# Patient Record
Sex: FEMALE | Race: WHITE | NOT HISPANIC OR LATINO | URBAN - METROPOLITAN AREA
[De-identification: names, ages, dates, MRNs, and addresses within clinical notes are randomized per-mention and may not be internally consistent; named-entity substitution may affect disease eponyms.]

---

## 2020-06-28 ENCOUNTER — EMERGENCY (EMERGENCY)
Facility: HOSPITAL | Age: 50
LOS: 1 days | Discharge: HOME | End: 2020-06-28
Attending: EMERGENCY MEDICINE
Payer: MEDICAID

## 2020-06-28 VITALS
WEIGHT: 176.37 LBS | HEIGHT: 67 IN | DIASTOLIC BLOOD PRESSURE: 70 MMHG | SYSTOLIC BLOOD PRESSURE: 137 MMHG | HEART RATE: 96 BPM | OXYGEN SATURATION: 99 % | RESPIRATION RATE: 20 BRPM | TEMPERATURE: 99 F

## 2020-06-28 VITALS
OXYGEN SATURATION: 100 % | SYSTOLIC BLOOD PRESSURE: 111 MMHG | RESPIRATION RATE: 18 BRPM | DIASTOLIC BLOOD PRESSURE: 79 MMHG | TEMPERATURE: 98 F | HEART RATE: 64 BPM

## 2020-06-28 PROCEDURE — 99284 EMERGENCY DEPT VISIT MOD MDM: CPT

## 2020-06-28 PROCEDURE — 70450 CT HEAD/BRAIN W/O DYE: CPT | Mod: 26

## 2020-06-28 PROCEDURE — 71046 X-RAY EXAM CHEST 2 VIEWS: CPT

## 2020-06-28 PROCEDURE — 70450 CT HEAD/BRAIN W/O DYE: CPT

## 2020-06-28 PROCEDURE — 71046 X-RAY EXAM CHEST 2 VIEWS: CPT | Mod: 26

## 2020-06-28 RX ORDER — ONDANSETRON 8 MG/1
8 TABLET, FILM COATED ORAL ONCE
Refills: 0 | Status: COMPLETED | OUTPATIENT
Start: 2020-06-28 | End: 2020-06-28

## 2020-06-28 RX ORDER — ACETAMINOPHEN 500 MG
975 TABLET ORAL ONCE
Refills: 0 | Status: COMPLETED | OUTPATIENT
Start: 2020-06-28 | End: 2020-06-28

## 2020-06-28 RX ADMIN — ONDANSETRON 8 MILLIGRAM(S): 8 TABLET, FILM COATED ORAL at 11:04

## 2020-06-28 RX ADMIN — Medication 975 MILLIGRAM(S): at 10:30

## 2020-06-28 NOTE — ED ADULT NURSE NOTE - CAS TRG GENERAL [facility]WAY, MLM
Clinical Pharmacy Progress Note: Medication Education     Pharmacist educated patient and family on Eliquis indication, side effects, and drug interactions. Pharmacist discussed importance of medication compliance and reviewed signs of abnormal bleeding. Pharmacist gave patient and family Eliquis educational handout. Patient and family expressed understanding and had no further questions.    Thank you for allowing us to participate in this patient's care.     Vivian Richards 11/13/2019 12:25 PM     Patent

## 2020-06-28 NOTE — ED PROVIDER NOTE - PHYSICAL EXAMINATION
Well Appearing, Nontoxic, NAD;  Symm Facies, No external signs of trauma, PERRL 3mm, EOMI w/o pain/diplopia, MMM;  No CTL spine tender;  RRR w/o m/g/r, equal distal pulses;   CTAB w/o w/r/r;   Abd soft, nt/nd, +bs;  No jessi/joint deformity/tenderness;  AOX3, Normal speech, CN grossly intact, normal strength/sensation/gait, (-) ataxia

## 2020-06-28 NOTE — ED PROVIDER NOTE - NS ED ROS FT
(+) headache, off balance, nausea  (-) AMS/delirium, neck pain, numbness, weakness, sob/dyspnea, chest pain, open wounds, bruising

## 2020-06-28 NOTE — ED PROVIDER NOTE - CLINICAL SUMMARY MEDICAL DECISION MAKING FREE TEXT BOX
------------ATTENDING NOTE------------  pt c/o mechanical fall down several steps 48 hrs ago, hit R side of head on ground, no LOC but felt stunned, no additional injuries/complaints, having continued gradual onset tension type headaches and intermittent nausea and feeling off balance at times since injury, c/w concussion, CTL spine clinically cleared, benign stable chest/abdomen, LMP 3 wk ago,   - Oseas Gill MD   ------------------------------------------------------- ------------ATTENDING NOTE------------  pt c/o mechanical fall down several steps 48 hrs ago, hit R side of head on ground, no LOC but felt stunned, no additional injuries/complaints, having continued gradual onset tension type headaches and intermittent nausea and feeling off balance at times since injury, c/w concussion, CTL spine clinically cleared, benign stable chest/abdomen, LMP 3 wk ago, imaging wnl, benign repeat exams, nml gait, in depth dw pt about ddx, tx, ibanez, continued close outpt fu.  - Oseas Gill MD   -------------------------------------------------------

## 2020-06-28 NOTE — ED PROVIDER NOTE - NSFOLLOWUPINSTRUCTIONS_ED_ALL_ED_FT
See your Primary Doctor this week for follow up -- call to discuss.    Acetaminophen as directed for pain -- see medication warnings.    Continue all current medications / treatments as previously directed.    See CONCUSSION information and return instructions given to you.    Seek immediate medical care for new/worsening symptoms/concerns.

## 2020-06-28 NOTE — ED PROVIDER NOTE - PATIENT PORTAL LINK FT
You can access the FollowMyHealth Patient Portal offered by [facility] by registering at the following website: [email or web address]. By joining [employer]’s FollowMyHealth portal, you will also be able to view your health information using other applications (apps) compatible with our system.

## 2020-06-28 NOTE — ED ADULT NURSE NOTE - OBJECTIVE STATEMENT
49 female hx myasthenia gravis here c/o headache s/p fall 3 days ago. pt. states she had a mechanical fall at home, hitting the right side of her head. now c/o continued right anterior head pain, some nausea, but no vomiting. denies CP, SOB. no abd pain, denies LOC at time of fall. denies numbness/tingling. on exam complains of some pain to lower back but states "I always have this pain." no deformity, no step offs, no bleeding or abrasions to the head/scalp. PEERL, moving all extremities, strength and full ROM in tact. VSS, in no acute distress.

## 2023-02-25 ENCOUNTER — EMERGENCY (EMERGENCY)
Facility: HOSPITAL | Age: 53
LOS: 1 days | Discharge: HOME | End: 2023-02-25
Attending: EMERGENCY MEDICINE
Payer: MEDICAID

## 2023-02-25 VITALS
RESPIRATION RATE: 18 BRPM | TEMPERATURE: 97 F | SYSTOLIC BLOOD PRESSURE: 157 MMHG | DIASTOLIC BLOOD PRESSURE: 94 MMHG | HEART RATE: 78 BPM | OXYGEN SATURATION: 100 %

## 2023-02-25 VITALS
SYSTOLIC BLOOD PRESSURE: 118 MMHG | RESPIRATION RATE: 18 BRPM | DIASTOLIC BLOOD PRESSURE: 82 MMHG | HEIGHT: 55 IN | TEMPERATURE: 98 F | WEIGHT: 169.76 LBS | OXYGEN SATURATION: 99 % | HEART RATE: 73 BPM

## 2023-02-25 LAB
ALBUMIN SERPL ELPH-MCNC: 4.7 G/DL — SIGNIFICANT CHANGE UP (ref 3.3–5)
ALP SERPL-CCNC: 68 U/L — SIGNIFICANT CHANGE UP (ref 40–120)
ALT FLD-CCNC: 31 U/L — SIGNIFICANT CHANGE UP (ref 10–45)
ANION GAP SERPL CALC-SCNC: 11 MMOL/L — SIGNIFICANT CHANGE UP (ref 5–17)
AST SERPL-CCNC: 30 U/L — SIGNIFICANT CHANGE UP (ref 10–40)
BASOPHILS # BLD AUTO: 0.03 K/UL — SIGNIFICANT CHANGE UP (ref 0–0.2)
BASOPHILS NFR BLD AUTO: 0.3 % — SIGNIFICANT CHANGE UP (ref 0–2)
BILIRUB SERPL-MCNC: 0.4 MG/DL — SIGNIFICANT CHANGE UP (ref 0.2–1.2)
BUN SERPL-MCNC: 11 MG/DL — SIGNIFICANT CHANGE UP (ref 7–23)
CALCIUM SERPL-MCNC: 10 MG/DL — SIGNIFICANT CHANGE UP (ref 8.4–10.5)
CHLORIDE SERPL-SCNC: 102 MMOL/L — SIGNIFICANT CHANGE UP (ref 96–108)
CO2 SERPL-SCNC: 26 MMOL/L — SIGNIFICANT CHANGE UP (ref 22–31)
CREAT SERPL-MCNC: 0.54 MG/DL — SIGNIFICANT CHANGE UP (ref 0.5–1.3)
CRP SERPL-MCNC: 4 MG/L — SIGNIFICANT CHANGE UP (ref 0–4)
EGFR: 111 ML/MIN/1.73M2 — SIGNIFICANT CHANGE UP
EOSINOPHIL # BLD AUTO: 0.13 K/UL — SIGNIFICANT CHANGE UP (ref 0–0.5)
EOSINOPHIL NFR BLD AUTO: 1.2 % — SIGNIFICANT CHANGE UP (ref 0–6)
GLUCOSE SERPL-MCNC: 72 MG/DL — SIGNIFICANT CHANGE UP (ref 70–99)
HCT VFR BLD CALC: 40.1 % — SIGNIFICANT CHANGE UP (ref 34.5–45)
HGB BLD-MCNC: 12.7 G/DL — SIGNIFICANT CHANGE UP (ref 11.5–15.5)
IMM GRANULOCYTES NFR BLD AUTO: 0.4 % — SIGNIFICANT CHANGE UP (ref 0–0.9)
LYMPHOCYTES # BLD AUTO: 2.71 K/UL — SIGNIFICANT CHANGE UP (ref 1–3.3)
LYMPHOCYTES # BLD AUTO: 25.4 % — SIGNIFICANT CHANGE UP (ref 13–44)
MAGNESIUM SERPL-MCNC: 2.4 MG/DL — SIGNIFICANT CHANGE UP (ref 1.6–2.6)
MCHC RBC-ENTMCNC: 28.9 PG — SIGNIFICANT CHANGE UP (ref 27–34)
MCHC RBC-ENTMCNC: 31.7 GM/DL — LOW (ref 32–36)
MCV RBC AUTO: 91.3 FL — SIGNIFICANT CHANGE UP (ref 80–100)
MONOCYTES # BLD AUTO: 0.8 K/UL — SIGNIFICANT CHANGE UP (ref 0–0.9)
MONOCYTES NFR BLD AUTO: 7.5 % — SIGNIFICANT CHANGE UP (ref 2–14)
NEUTROPHILS # BLD AUTO: 6.94 K/UL — SIGNIFICANT CHANGE UP (ref 1.8–7.4)
NEUTROPHILS NFR BLD AUTO: 65.2 % — SIGNIFICANT CHANGE UP (ref 43–77)
NRBC # BLD: 0 /100 WBCS — SIGNIFICANT CHANGE UP (ref 0–0)
PLATELET # BLD AUTO: 292 K/UL — SIGNIFICANT CHANGE UP (ref 150–400)
POTASSIUM SERPL-MCNC: 4.7 MMOL/L — SIGNIFICANT CHANGE UP (ref 3.5–5.3)
POTASSIUM SERPL-SCNC: 4.7 MMOL/L — SIGNIFICANT CHANGE UP (ref 3.5–5.3)
PROT SERPL-MCNC: 7.4 G/DL — SIGNIFICANT CHANGE UP (ref 6–8.3)
RBC # BLD: 4.39 M/UL — SIGNIFICANT CHANGE UP (ref 3.8–5.2)
RBC # FLD: 13.4 % — SIGNIFICANT CHANGE UP (ref 10.3–14.5)
SODIUM SERPL-SCNC: 139 MMOL/L — SIGNIFICANT CHANGE UP (ref 135–145)
WBC # BLD: 10.65 K/UL — HIGH (ref 3.8–10.5)
WBC # FLD AUTO: 10.65 K/UL — HIGH (ref 3.8–10.5)

## 2023-02-25 PROCEDURE — 99283 EMERGENCY DEPT VISIT LOW MDM: CPT

## 2023-02-25 PROCEDURE — 83735 ASSAY OF MAGNESIUM: CPT

## 2023-02-25 PROCEDURE — 36415 COLL VENOUS BLD VENIPUNCTURE: CPT

## 2023-02-25 PROCEDURE — 86140 C-REACTIVE PROTEIN: CPT

## 2023-02-25 PROCEDURE — 99284 EMERGENCY DEPT VISIT MOD MDM: CPT

## 2023-02-25 PROCEDURE — 85652 RBC SED RATE AUTOMATED: CPT

## 2023-02-25 PROCEDURE — 85025 COMPLETE CBC W/AUTO DIFF WBC: CPT

## 2023-02-25 PROCEDURE — 80053 COMPREHEN METABOLIC PANEL: CPT

## 2023-02-25 NOTE — ED PROVIDER NOTE - ATTENDING CONTRIBUTION TO CARE
Bonifacio David MD:  I personally saw the patient and performed a substantive portion of the visit including all aspects of the medical decision making.    MDM: 52-year-old female with history of myasthenia gravis status post thymectomy, who presents with 5 to 7 days of left-sided facial pain associated with vision changes of the left eye.  Patient states that this feels different from her prior myasthenia gravis flares, which normally present with increased secretions and eyelid drooping.  However she denies any symptoms of that at this time.  Patient states that her vision changes are difficult to describe, and feels that it is heaviness of the left eye, but no facial eye drooping.  She also states that she has baseline double vision.  Patient also denies any chest pain, shortness of breath, dizziness, breathlessness.    ROS: patient denies trauma, fall, head injury, fevers, chills, dizziness, neck pain or stiffness, numbness, weakness, vomiting, slurred speech, imbalance.    On examination, patient with stable vitals, well-appearing, in no-acute distress.  Eye exam shows 20/30 vision in both eyes without her glasses.  EOMI without diplopia or discomfort reported.  No eyelid drooping.  Cardiac examination RRR, lungs CTAB, abdomen soft and nontender, neurovascularly intact in all 4 extremities.  NEURO exam shows AAOx3, Cranial nerves III-XII intact. Strength intact with 5/5 strength in all 4 extremities. Sensation intact to light touch in all 4 extremities. No pronator drift. No dysmetria with finger-nose-finger. Normal gait without ataxia. Normal balance and speech.    Will obtain CT Head and max face to evaluate for acute intracranial and maxillofacial pathology.  Will obtain labs to evaluate for hematologic disorder, metabolic derangements, hepatic and renal function, and screen for infectious pathology.  Will obtain ESR and CRP to evaluate for temporal arteritis.  Will consult with neurology and ophthalmology.  Patient without any focal neurodeficits and out of the window for stroke code or tPA or thrombectomy.    Differential includes but is not limited to: Temporal arteritis, trigeminal neuralgia, myasthenia gravis, intracranial pathology, ocular pathology.    [Patient with new problems requiring additional work-up and treatment, following orders: see above]  [Obtained and reviewed external records: N/A]  [Additional history obtained from:  at bedside]  [Chronic conditions and social determinants of health affecting care: See above]  [Consideration of admission/observation: The patient was AMA]    Labs reviewed, patient with WBC of 10.65, but CRP normal range, ESR still pending.  CT imaging not performed yet, and patient has not been seen by neurology or ophthalmology.  Patient was requesting to leave AMA.    Prolonged discussion with patient, patient should follow-up closely with neurology and ophthalmology within the next 2 days, or to return to the ED if she changes her mind or is unable to follow-up as outpatient.    Patient has decided to sign out against medical advice.  I had a detailed discussion involving the risks, consequences and alternatives to signing out AMA. The patient and/or family members verbalized understanding of the presenting medical illness, treatment options, the risks, benefits and alternatives, to treatment, admission and further workup, including neurologic impairment, functional disability, death or worsening of current condition causing permanent lifestyle changes and has decided to sign out AMA.  I advised the patient to return anytime for worsening symptoms and encouraged follow up with primary care doctor as soon as possible.  Patient is alert and oriented to person, place and time and does not meet criteria for involuntary commitment.

## 2023-02-25 NOTE — ED PROVIDER NOTE - PROGRESS NOTE DETAILS
Rishi Spence MD, PGY-1: The patient has decided to leave against medical advice.  The patient is alert and oriented, not intoxicated, and displays normal decision making ability. We discussed all risks, benefits, and alternatives to the progression of treatment and the potential dangers of leaving including but not limited to permanent disability, injury, and death.  The patient was instructed that they are welcome to change their decision to leave against medical advice and return to the emergency department at any time and for any reason in order to allow us to render care.

## 2023-02-25 NOTE — ED PROVIDER NOTE - NSFOLLOWUPINSTRUCTIONS_ED_ALL_ED_FT
You were seen in the emergency department for left facial pain. You are leaving prior to a complete hospital evaluation.  You are welcome to return to the emergency department at any time.    During your stay you had the following relevant results: Lab work which did not reveal any electrolyte abnormalities    Please follow up with your Neurologist to discuss the results of your stay in our department.    If you start to experience worsening symptoms such as Loss of vision, severe pain, facial drooping, difficulty speaking, please return to the emergency department for further evaluation.

## 2023-02-25 NOTE — ED PROVIDER NOTE - PATIENT PORTAL LINK FT
You can access the FollowMyHealth Patient Portal offered by [facility] by registering at the following website: [email or web address]. By joining [employer]’s FollowMyHealth portal, you will also be able to view your health information using other applications (apps) compatible with our system.

## 2023-02-25 NOTE — ED ADULT NURSE NOTE - CHIEF COMPLAINT QUOTE
left sided facial pain, no rash, visual changes intermittently over 5 days.  After triage completed, c/o left ankle pain

## 2023-02-25 NOTE — ED ADULT TRIAGE NOTE - CHIEF COMPLAINT QUOTE
left sided facial pain, no rash, visual changes intermittently over 5 days left sided facial pain, no rash, visual changes intermittently over 5 days.  After triage completed, c/o left ankle pain

## 2023-02-25 NOTE — ED PROVIDER NOTE - OBJECTIVE STATEMENT
52-year-old female with past medical history of myasthenia gravis status post thymectomy here for 1 week of left-sided facial pain associated with vision change.  She states that she has not had any recent minus the neck flares which typically present with increased oral secretions and eyelid drooping.  She says she has not had pain like this before.  She denies any swelling, redness, fevers, chills, nausea, vomiting.  She has been trying both acetaminophen and ibuprofen without relief of the pain.

## 2023-02-25 NOTE — ED PROVIDER NOTE - CLINICAL SUMMARY MEDICAL DECISION MAKING FREE TEXT BOX
52-year-old female with myasthenia gravis here for left facial pain for the past week.  At this time, the differential diagnosis includes but is not limited to trigeminal neuralgia, giant cell arteritis, migraines, intracranial malignancy.  Given that this is a new headache after the age of 50, will obtain CT head as well as CT maxillofacial for further characterization.  Will obtain CBC, CMP, ESR, CRP.

## 2023-02-25 NOTE — ED ADULT NURSE NOTE - OBJECTIVE STATEMENT
51 y/o F with PMHx of Myasthenia Gravis presents to the ED with complaints of L sided facial pain. Patient reports L sided facial pain which radiates toward the frontal region of the head. Patient notes pain onset 5 days ago with associated nausea, dizziness, blurry vision and gum pain. Patient denies fever, chills, chest pain, SOB. AOx4 and speaking coherently with  at bedside. Breathing is unlabored, spontaneous, and symmetrical. Lung sounds clear throughout. +S1S2. Abdomen and bladder are nondistended. No peripheral edema. <2s capillary refill. Ambulatory with full ROM of all extremities. Muscle strength 5/5 in upper and lower extremities bilaterally. PERRL. EOMI.

## 2023-02-25 NOTE — ED PROVIDER NOTE - PHYSICAL EXAMINATION
General: well appearing, alert, oriented to person, time, place  Psych: mood appropriate  Head: normocephalic; atraumatic  Eyes: Visual acuity 20/30 in both eyes; PERRLA, EOMI, conjunctivae clear bilaterally, sclerae anicteric  ENT: no nasal flaring, patent nares  Cardio: Skin warm and well-perfused  Resp: Normal respiratory effort; no accessory muscle use  Neuro: Cranial nerves II through XII intact  Skin: No evidence of rash or bruising  MSK: normal movement of all extremities

## 2023-02-26 PROBLEM — G70.00 MYASTHENIA GRAVIS WITHOUT (ACUTE) EXACERBATION: Chronic | Status: ACTIVE | Noted: 2020-06-28

## 2023-02-26 LAB — ERYTHROCYTE [SEDIMENTATION RATE] IN BLOOD: 25 MM/HR — HIGH (ref 0–20)

## 2023-02-27 ENCOUNTER — EMERGENCY (EMERGENCY)
Facility: HOSPITAL | Age: 53
LOS: 1 days | Discharge: HOME | End: 2023-02-27
Attending: EMERGENCY MEDICINE
Payer: MEDICAID

## 2023-02-27 VITALS
HEIGHT: 62 IN | SYSTOLIC BLOOD PRESSURE: 124 MMHG | DIASTOLIC BLOOD PRESSURE: 83 MMHG | HEART RATE: 72 BPM | RESPIRATION RATE: 18 BRPM | OXYGEN SATURATION: 100 % | TEMPERATURE: 98 F | WEIGHT: 149.91 LBS

## 2023-02-27 VITALS
DIASTOLIC BLOOD PRESSURE: 92 MMHG | RESPIRATION RATE: 20 BRPM | OXYGEN SATURATION: 99 % | SYSTOLIC BLOOD PRESSURE: 141 MMHG | HEART RATE: 68 BPM | TEMPERATURE: 98 F

## 2023-02-27 LAB
ALBUMIN SERPL ELPH-MCNC: 4.6 G/DL — SIGNIFICANT CHANGE UP (ref 3.3–5)
ALP SERPL-CCNC: 65 U/L — SIGNIFICANT CHANGE UP (ref 40–120)
ALT FLD-CCNC: 34 U/L — SIGNIFICANT CHANGE UP (ref 10–45)
ANION GAP SERPL CALC-SCNC: 14 MMOL/L — SIGNIFICANT CHANGE UP (ref 5–17)
APPEARANCE UR: CLEAR — SIGNIFICANT CHANGE UP
AST SERPL-CCNC: 37 U/L — SIGNIFICANT CHANGE UP (ref 10–40)
BACTERIA # UR AUTO: NEGATIVE — SIGNIFICANT CHANGE UP
BASOPHILS # BLD AUTO: 0.04 K/UL — SIGNIFICANT CHANGE UP (ref 0–0.2)
BASOPHILS NFR BLD AUTO: 0.5 % — SIGNIFICANT CHANGE UP (ref 0–2)
BILIRUB SERPL-MCNC: 0.5 MG/DL — SIGNIFICANT CHANGE UP (ref 0.2–1.2)
BILIRUB UR-MCNC: NEGATIVE — SIGNIFICANT CHANGE UP
BUN SERPL-MCNC: 12 MG/DL — SIGNIFICANT CHANGE UP (ref 7–23)
CALCIUM SERPL-MCNC: 9.9 MG/DL — SIGNIFICANT CHANGE UP (ref 8.4–10.5)
CHLORIDE SERPL-SCNC: 103 MMOL/L — SIGNIFICANT CHANGE UP (ref 96–108)
CO2 SERPL-SCNC: 22 MMOL/L — SIGNIFICANT CHANGE UP (ref 22–31)
COLOR SPEC: SIGNIFICANT CHANGE UP
CREAT SERPL-MCNC: 0.55 MG/DL — SIGNIFICANT CHANGE UP (ref 0.5–1.3)
DIFF PNL FLD: NEGATIVE — SIGNIFICANT CHANGE UP
EGFR: 110 ML/MIN/1.73M2 — SIGNIFICANT CHANGE UP
EOSINOPHIL # BLD AUTO: 0.06 K/UL — SIGNIFICANT CHANGE UP (ref 0–0.5)
EOSINOPHIL NFR BLD AUTO: 0.7 % — SIGNIFICANT CHANGE UP (ref 0–6)
EPI CELLS # UR: 1 /HPF — SIGNIFICANT CHANGE UP
GLUCOSE SERPL-MCNC: 93 MG/DL — SIGNIFICANT CHANGE UP (ref 70–99)
GLUCOSE UR QL: NEGATIVE — SIGNIFICANT CHANGE UP
HCT VFR BLD CALC: 41.3 % — SIGNIFICANT CHANGE UP (ref 34.5–45)
HGB BLD-MCNC: 12.9 G/DL — SIGNIFICANT CHANGE UP (ref 11.5–15.5)
HYALINE CASTS # UR AUTO: 0 /LPF — SIGNIFICANT CHANGE UP (ref 0–2)
IMM GRANULOCYTES NFR BLD AUTO: 0.2 % — SIGNIFICANT CHANGE UP (ref 0–0.9)
KETONES UR-MCNC: NEGATIVE — SIGNIFICANT CHANGE UP
LEUKOCYTE ESTERASE UR-ACNC: NEGATIVE — SIGNIFICANT CHANGE UP
LYMPHOCYTES # BLD AUTO: 1.91 K/UL — SIGNIFICANT CHANGE UP (ref 1–3.3)
LYMPHOCYTES # BLD AUTO: 23.8 % — SIGNIFICANT CHANGE UP (ref 13–44)
MAGNESIUM SERPL-MCNC: 2.5 MG/DL — SIGNIFICANT CHANGE UP (ref 1.6–2.6)
MCHC RBC-ENTMCNC: 28.7 PG — SIGNIFICANT CHANGE UP (ref 27–34)
MCHC RBC-ENTMCNC: 31.2 GM/DL — LOW (ref 32–36)
MCV RBC AUTO: 91.8 FL — SIGNIFICANT CHANGE UP (ref 80–100)
MONOCYTES # BLD AUTO: 0.61 K/UL — SIGNIFICANT CHANGE UP (ref 0–0.9)
MONOCYTES NFR BLD AUTO: 7.6 % — SIGNIFICANT CHANGE UP (ref 2–14)
NEUTROPHILS # BLD AUTO: 5.4 K/UL — SIGNIFICANT CHANGE UP (ref 1.8–7.4)
NEUTROPHILS NFR BLD AUTO: 67.2 % — SIGNIFICANT CHANGE UP (ref 43–77)
NITRITE UR-MCNC: NEGATIVE — SIGNIFICANT CHANGE UP
NRBC # BLD: 0 /100 WBCS — SIGNIFICANT CHANGE UP (ref 0–0)
PH UR: 6 — SIGNIFICANT CHANGE UP (ref 5–8)
PLATELET # BLD AUTO: 291 K/UL — SIGNIFICANT CHANGE UP (ref 150–400)
POTASSIUM SERPL-MCNC: 4.7 MMOL/L — SIGNIFICANT CHANGE UP (ref 3.5–5.3)
POTASSIUM SERPL-SCNC: 4.7 MMOL/L — SIGNIFICANT CHANGE UP (ref 3.5–5.3)
PROCALCITONIN SERPL-MCNC: <0.03 NG/ML — SIGNIFICANT CHANGE UP (ref 0.02–0.1)
PROT SERPL-MCNC: 7.5 G/DL — SIGNIFICANT CHANGE UP (ref 6–8.3)
PROT UR-MCNC: NEGATIVE — SIGNIFICANT CHANGE UP
RAPID RVP RESULT: SIGNIFICANT CHANGE UP
RBC # BLD: 4.5 M/UL — SIGNIFICANT CHANGE UP (ref 3.8–5.2)
RBC # FLD: 13.4 % — SIGNIFICANT CHANGE UP (ref 10.3–14.5)
RBC CASTS # UR COMP ASSIST: 1 /HPF — SIGNIFICANT CHANGE UP (ref 0–4)
SARS-COV-2 RNA SPEC QL NAA+PROBE: SIGNIFICANT CHANGE UP
SODIUM SERPL-SCNC: 139 MMOL/L — SIGNIFICANT CHANGE UP (ref 135–145)
SP GR SPEC: 1.02 — SIGNIFICANT CHANGE UP (ref 1.01–1.02)
T3 SERPL-MCNC: 130 NG/DL — SIGNIFICANT CHANGE UP (ref 80–200)
T4 AB SER-ACNC: 7.8 UG/DL — SIGNIFICANT CHANGE UP (ref 4.6–12)
TSH SERPL-MCNC: 1.11 UIU/ML — SIGNIFICANT CHANGE UP (ref 0.27–4.2)
UROBILINOGEN FLD QL: NEGATIVE — SIGNIFICANT CHANGE UP
WBC # BLD: 8.04 K/UL — SIGNIFICANT CHANGE UP (ref 3.8–10.5)
WBC # FLD AUTO: 8.04 K/UL — SIGNIFICANT CHANGE UP (ref 3.8–10.5)
WBC UR QL: 0 /HPF — SIGNIFICANT CHANGE UP (ref 0–5)

## 2023-02-27 PROCEDURE — 84480 ASSAY TRIIODOTHYRONINE (T3): CPT

## 2023-02-27 PROCEDURE — 81001 URINALYSIS AUTO W/SCOPE: CPT

## 2023-02-27 PROCEDURE — 93005 ELECTROCARDIOGRAM TRACING: CPT

## 2023-02-27 PROCEDURE — 94150 VITAL CAPACITY TEST: CPT

## 2023-02-27 PROCEDURE — 87086 URINE CULTURE/COLONY COUNT: CPT

## 2023-02-27 PROCEDURE — 85025 COMPLETE CBC W/AUTO DIFF WBC: CPT

## 2023-02-27 PROCEDURE — 36415 COLL VENOUS BLD VENIPUNCTURE: CPT

## 2023-02-27 PROCEDURE — 99285 EMERGENCY DEPT VISIT HI MDM: CPT

## 2023-02-27 PROCEDURE — 71046 X-RAY EXAM CHEST 2 VIEWS: CPT | Mod: 26

## 2023-02-27 PROCEDURE — 0225U NFCT DS DNA&RNA 21 SARSCOV2: CPT

## 2023-02-27 PROCEDURE — 84145 PROCALCITONIN (PCT): CPT

## 2023-02-27 PROCEDURE — 83880 ASSAY OF NATRIURETIC PEPTIDE: CPT

## 2023-02-27 PROCEDURE — 71046 X-RAY EXAM CHEST 2 VIEWS: CPT

## 2023-02-27 PROCEDURE — 84443 ASSAY THYROID STIM HORMONE: CPT

## 2023-02-27 PROCEDURE — 84484 ASSAY OF TROPONIN QUANT: CPT

## 2023-02-27 PROCEDURE — 84436 ASSAY OF TOTAL THYROXINE: CPT

## 2023-02-27 PROCEDURE — 99285 EMERGENCY DEPT VISIT HI MDM: CPT | Mod: 25

## 2023-02-27 PROCEDURE — 83735 ASSAY OF MAGNESIUM: CPT

## 2023-02-27 PROCEDURE — 80053 COMPREHEN METABOLIC PANEL: CPT

## 2023-02-27 NOTE — CONSULT NOTE ADULT - ASSESSMENT
52y (1970) woman with a PMHx significant for MG s/p thymectomy presented to the ED for left facial pain. Neurology consulted for worsening MG. Patient stated that she was diagnosed with occular MG at age 26 (although her symptoms began at age 21 and was misdiagnosed until then). Around one year later received a thymectomy which greatly improved her symptoms. Pt stated that at baseline she has diplopia (which is improved while wearing glasses), denied any acutely worsening bulbar symptoms, however endorses around 4 days of lower gum pain, left facial pain (V2-V3) and left posterior ear tenderness.   Pt currently taking mestinon 60 TID with no recent increased doses.  Pt denied any MG crises in her life and has never been in the ICU for MG.  Pt has a Neurologist Dr. Fidel Bhardwaj.      Impression:  Stable MG     Recommendations:     [] F/u NIF/VC results (already completed in the ED and was verbally told was wnl  [] C/w current dosing of Mestinon   [] Pt may follow up with home neurologist     Rest of care per primary team     Case to be d/w and seen by general neurology attending.  Recommendations to be finalized upon attendings attestation

## 2023-02-27 NOTE — ED PROVIDER NOTE - PHYSICAL EXAMINATION
Well Appearing, Nontoxic, NAD;  Symm Facies, PERRL 3mm, (-)Pallor, slight bilat ptosis, Anicteric, VF/VA wnl, MMM;  No JVD/Bruits or stridor;  RRR w/o m/g/r, equal distal pulses;   CTAB w/o distress;   Abd soft, nt/nd, +bs;  No CVAT;  No edema/calf tender;  No rash;  AOX3, Normal speech, CN grossly intact, normal strength/sensation/gait

## 2023-02-27 NOTE — ED ADULT NURSE NOTE - NSIMPLEMENTINTERV_GEN_ALL_ED
Detail Level: Zone
Implemented All Universal Safety Interventions:  [place] to call system. Call bell, personal items and telephone within reach. Instruct patient to call for assistance. Room bathroom lighting operational. Non-slip footwear when patient is off stretcher. Physically safe environment: no spills, clutter or unnecessary equipment. Stretcher in lowest position, wheels locked, appropriate side rails in place.

## 2023-02-27 NOTE — ED ADULT NURSE NOTE - OBJECTIVE STATEMENT
52F pt AxOx3 ambulatory to ED c/o facial pain x8 days. Pt was seen in ED 2 days ago for same complaint but signed out AMA. Pt denies vision changes. Pt was instructed to have CTH. On assessment, pt ambulates w/ steady gait noted. Airway patent, resp even, unlabored. PERRLA, 4mm reactive b/l. Gross Neuro intact, sensory intact. MAEx4. EKG done. #20G RAC, labs drawn and sent. Bed locked in lowest position. Safety maintained. MD at bedside for eval.

## 2023-02-27 NOTE — ED PROVIDER NOTE - NSFOLLOWUPINSTRUCTIONS_ED_ALL_ED_FT
See your Neurologist and Ophthalmologist this week for management -- call to discuss.    Stay well hydrated, get plenty of rest, continue current medications, treatments.    Use Acetaminophen as directed for pain -- see medications warnings.    See MYASTHENIA GRAVIS information and return instructions given to you.    Seek immediate medical care for new/worsening symptoms/concerns.

## 2023-02-27 NOTE — ED PROVIDER NOTE - CLINICAL SUMMARY MEDICAL DECISION MAKING FREE TEXT BOX
------------ATTENDING NOTE------------  pt c/o several days of increasing visual changes (describing difficulty focusing, double vision), difficulty eating/swallowing, overall mild generalized fatigue and sob with exertion, complicated as on Pyridostigmine for Myasthenia Gravis, no sob/dyspnea at rest, no recent fevers/illness, c/o mild ache in L face (no redness/swelling, ESR < 50 to r/o GCA, ? trigeminal neuralgia), no dental pain/swelling or TMJ pain/tenderness, awaiting labs/imaging and Neuro consul -->  - Oseas Gill MD   ---------------------------------------------- ------------ATTENDING NOTE------------  pt c/o several days of increasing visual changes (describing difficulty focusing, double vision), difficulty eating/swallowing, overall mild generalized fatigue and sob with exertion, complicated as on Pyridostigmine for Myasthenia Gravis, no sob/dyspnea at rest, no recent fevers/illness, c/o mild ache in L face (no redness/swelling, ESR < 50 to r/o GCA, ? trigeminal neuralgia), no dental pain/swelling or TMJ pain/tenderness, awaiting labs/imaging and Neuro consul --> labs wnl (hemolyzed K+ but wnl 2 days ago), pending Neuro consult -->  - Oseas Gill MD   ---------------------------------------------- ------------ATTENDING NOTE------------  pt c/o several days of increasing visual changes (describing difficulty focusing, double vision), difficulty eating/swallowing, overall mild generalized fatigue and sob with exertion, complicated as on Pyridostigmine for Myasthenia Gravis, no sob/dyspnea at rest, no recent fevers/illness, c/o mild ache in L face (no redness/swelling, ESR < 50 to r/o GCA, ? trigeminal neuralgia), no dental pain/swelling or TMJ pain/tenderness, awaiting labs/imaging and Neuro consul --> labs wnl (hemolyzed K+ but wnl 2 days ago), pending Neuro consult --> labs wnl, remained stable, cleared by Neuro for close outpt fu, nml VS at OK, in depth dw all about ddx, tx, ibanez, continued close outpt fu.  - Oseas Gill MD   ----------------------------------------------

## 2023-02-27 NOTE — ED ADULT TRIAGE NOTE - CHIEF COMPLAINT QUOTE
Seen in ED Saturday L facial pain rad eye x 7-8 days "   Denies change in vision Signed AMA Was instructed to have CT , left ED  BEFAST NEG

## 2023-02-27 NOTE — ED PROVIDER NOTE - PATIENT PORTAL LINK FT
You can access the FollowMyHealth Patient Portal offered by [facility] by registering at the following website: [email or web address]. By joining [employer]’s FollowMyHealth portal, you will also be able to view your health information using other applications (apps) compatible with our system.

## 2023-02-27 NOTE — CONSULT NOTE ADULT - SUBJECTIVE AND OBJECTIVE BOX
Neurology - Consult Note    -  Spectra: [phone] ([facility]), [phone] ([facility])  -    HPI: Patient QI BYERS is a 52y (1970) woman with a PMHx significant for MG s/p thymectomy presented to the ED for left facial pain. Neurology consulted for worsening MG. Patient stated that she was diagnosed with occular MG at age 26 (although her symptoms began at age 21 and was misdiagnosed until then). Around one year later received a thymectomy which greatly improved her symptoms. Pt stated that at baseline she has diplopia (which is improved while wearing glasses), denied any acutely worsening bulbar symptoms, however endorses around 4 days of lower gum pain, left facial pain (V2-V3) and left posterior ear tenderness.   Pt currently taking mestinon 60 TID with no recent increased doses.  Pt denied any MG crises in her life and has never been in the ICU for MG.  Pt has a Neurologist Dr. Fidel Bhardwaj.        Review of Systems:     CONSTITUTIONAL: No fevers or chills  EYES AND ENT: No visual changes or no throat pain   NECK: No pain or stiffness  RESPIRATORY: No hemoptysis or shortness of breath  CARDIOVASCULAR: No chest pain or palpitations  GASTROINTESTINAL: No melena or hematochezia  GENITOURINARY: No dysuria or hematuria  NEUROLOGICAL: +As stated in HPI above  SKIN: No itching, burning, rashes, or lesions   All other review of systems is negative unless indicated above.    Allergies:      PMHx/PSHx/Family Hx: As above, otherwise see below   Myasthenia      Medications:  MEDICATIONS  (STANDING):    MEDICATIONS  (PRN):        Home Medications:      Vitals:  T(C): 36.7 (02-27-23 @ 11:16), Max: 36.8 (02-27-23 @ 09:43)  HR: 61 (02-27-23 @ 11:16) (61 - 72)  BP: 125/85 (02-27-23 @ 11:16) (124/83 - 125/85)  RR: 18 (02-27-23 @ 11:16) (18 - 18)  SpO2: 100% (02-27-23 @ 11:16) (100% - 100%)    Physical Examination:    General - NAD  Cardiovascular - Peripheral pulses palpable, no edema    Neurologic Exam:  Mental status - Awake, Alert, Oriented to person, place, and time. Speech fluent, repetition and naming intact. Follows simple and complex commands.     Cranial nerves - PERRL, VFF, EOMI except unable to perform convergence (baseline), face sensation (V1-V3) intact LT, No facial asymmetry b/l, hearing grossly intact b/l, trapezius 5/5 strength b/l, tongue midline on protrusion   Can count to 32 in one breath   double vision on upgaze (baseline)    Motor - Normal bulk and tone throughout. No pronator drift.  Strength testing            Deltoid      Biceps      Triceps     Wrist Extension    Wrist Flexion       R            5                 5               5                     5                              5                        5  L             5                 5               5                     5                              5                       5              Hip Flexion    Hip Extension    Knee Flexion    Knee Extension    Dorsiflexion    Plantar Flexion  R              5                           5                       5                           5                            5                          5  L              5                           5                        5                           5                            5                          5    Sensation - Light touch/temperature intact throughout    DTR's -             Biceps      Triceps     Brachioradialis      Patellar    Ankle    Toes/plantar response  R             2+             2+                  2+                       2+            2+                 Down  L              2+             2+                 2+                        2+           2+                 Down    Coordination - Finger to Nose intact b/l. No tremors appreciated    Gait and station - Normal casual gait. Romberg (-)    Labs:                        12.9   8.04  )-----------( 291      ( 27 Feb 2023 10:44 )             41.3     02-27    139  |  103  |  12  ----------------------------<  93  4.7   |  22  |  0.55    Ca    9.9      27 Feb 2023 10:44  Mg     2.5     02-27    TPro  7.5  /  Alb  4.6  /  TBili  0.5  /  DBili  x   /  AST  37  /  ALT  34  /  AlkPhos  65  02-27    CAPILLARY BLOOD GLUCOSE        LIVER FUNCTIONS - ( 27 Feb 2023 10:44 )  Alb: 4.6 g/dL / Pro: 7.5 g/dL / ALK PHOS: 65 U/L / ALT: 34 U/L / AST: 37 U/L / GGT: x                 Radiology:

## 2023-02-28 LAB
CULTURE RESULTS: NO GROWTH — SIGNIFICANT CHANGE UP
SPECIMEN SOURCE: SIGNIFICANT CHANGE UP

## 2025-04-09 ENCOUNTER — EMERGENCY (EMERGENCY)
Facility: HOSPITAL | Age: 55
LOS: 1 days | End: 2025-04-09
Attending: EMERGENCY MEDICINE | Admitting: EMERGENCY MEDICINE
Payer: COMMERCIAL

## 2025-04-09 VITALS
SYSTOLIC BLOOD PRESSURE: 123 MMHG | DIASTOLIC BLOOD PRESSURE: 81 MMHG | OXYGEN SATURATION: 98 % | HEIGHT: 60 IN | HEART RATE: 83 BPM | TEMPERATURE: 98 F | RESPIRATION RATE: 18 BRPM | WEIGHT: 163.14 LBS

## 2025-04-09 LAB
ALBUMIN SERPL ELPH-MCNC: 4.1 G/DL — SIGNIFICANT CHANGE UP (ref 3.3–5)
ALP SERPL-CCNC: 61 U/L — SIGNIFICANT CHANGE UP (ref 40–120)
ALT FLD-CCNC: 33 U/L — SIGNIFICANT CHANGE UP (ref 4–33)
ANION GAP SERPL CALC-SCNC: 9 MMOL/L — SIGNIFICANT CHANGE UP (ref 7–14)
APTT BLD: 34.9 SEC — SIGNIFICANT CHANGE UP (ref 24.5–35.6)
AST SERPL-CCNC: 33 U/L — HIGH (ref 4–32)
BASOPHILS # BLD AUTO: 0.03 K/UL — SIGNIFICANT CHANGE UP (ref 0–0.2)
BASOPHILS NFR BLD AUTO: 0.3 % — SIGNIFICANT CHANGE UP (ref 0–2)
BILIRUB SERPL-MCNC: 0.3 MG/DL — SIGNIFICANT CHANGE UP (ref 0.2–1.2)
BUN SERPL-MCNC: 14 MG/DL — SIGNIFICANT CHANGE UP (ref 7–23)
CALCIUM SERPL-MCNC: 9.4 MG/DL — SIGNIFICANT CHANGE UP (ref 8.4–10.5)
CHLORIDE SERPL-SCNC: 106 MMOL/L — SIGNIFICANT CHANGE UP (ref 98–107)
CO2 SERPL-SCNC: 26 MMOL/L — SIGNIFICANT CHANGE UP (ref 22–31)
CREAT SERPL-MCNC: 0.64 MG/DL — SIGNIFICANT CHANGE UP (ref 0.5–1.3)
EGFR: 105 ML/MIN/1.73M2 — SIGNIFICANT CHANGE UP
EGFR: 105 ML/MIN/1.73M2 — SIGNIFICANT CHANGE UP
EOSINOPHIL # BLD AUTO: 0.12 K/UL — SIGNIFICANT CHANGE UP (ref 0–0.5)
EOSINOPHIL NFR BLD AUTO: 1.2 % — SIGNIFICANT CHANGE UP (ref 0–6)
GLUCOSE SERPL-MCNC: 94 MG/DL — SIGNIFICANT CHANGE UP (ref 70–99)
HCT VFR BLD CALC: 37.8 % — SIGNIFICANT CHANGE UP (ref 34.5–45)
HGB BLD-MCNC: 12.4 G/DL — SIGNIFICANT CHANGE UP (ref 11.5–15.5)
IANC: 6.25 K/UL — SIGNIFICANT CHANGE UP (ref 1.8–7.4)
IMM GRANULOCYTES NFR BLD AUTO: 0.2 % — SIGNIFICANT CHANGE UP (ref 0–0.9)
INR BLD: 1.06 RATIO — SIGNIFICANT CHANGE UP (ref 0.85–1.16)
LYMPHOCYTES # BLD AUTO: 2.77 K/UL — SIGNIFICANT CHANGE UP (ref 1–3.3)
LYMPHOCYTES # BLD AUTO: 27.8 % — SIGNIFICANT CHANGE UP (ref 13–44)
MCHC RBC-ENTMCNC: 29.3 PG — SIGNIFICANT CHANGE UP (ref 27–34)
MCHC RBC-ENTMCNC: 32.8 G/DL — SIGNIFICANT CHANGE UP (ref 32–36)
MCV RBC AUTO: 89.4 FL — SIGNIFICANT CHANGE UP (ref 80–100)
MONOCYTES # BLD AUTO: 0.77 K/UL — SIGNIFICANT CHANGE UP (ref 0–0.9)
MONOCYTES NFR BLD AUTO: 7.7 % — SIGNIFICANT CHANGE UP (ref 2–14)
NEUTROPHILS # BLD AUTO: 6.25 K/UL — SIGNIFICANT CHANGE UP (ref 1.8–7.4)
NEUTROPHILS NFR BLD AUTO: 62.8 % — SIGNIFICANT CHANGE UP (ref 43–77)
NRBC # BLD AUTO: 0 K/UL — SIGNIFICANT CHANGE UP (ref 0–0)
NRBC # FLD: 0 K/UL — SIGNIFICANT CHANGE UP (ref 0–0)
NRBC BLD AUTO-RTO: 0 /100 WBCS — SIGNIFICANT CHANGE UP (ref 0–0)
PLATELET # BLD AUTO: 282 K/UL — SIGNIFICANT CHANGE UP (ref 150–400)
POTASSIUM SERPL-MCNC: 3.9 MMOL/L — SIGNIFICANT CHANGE UP (ref 3.5–5.3)
POTASSIUM SERPL-SCNC: 3.9 MMOL/L — SIGNIFICANT CHANGE UP (ref 3.5–5.3)
PROT SERPL-MCNC: 6.5 G/DL — SIGNIFICANT CHANGE UP (ref 6–8.3)
PROTHROM AB SERPL-ACNC: 12.3 SEC — SIGNIFICANT CHANGE UP (ref 9.9–13.4)
RBC # BLD: 4.23 M/UL — SIGNIFICANT CHANGE UP (ref 3.8–5.2)
RBC # FLD: 13.7 % — SIGNIFICANT CHANGE UP (ref 10.3–14.5)
SODIUM SERPL-SCNC: 141 MMOL/L — SIGNIFICANT CHANGE UP (ref 135–145)
WBC # BLD: 9.96 K/UL — SIGNIFICANT CHANGE UP (ref 3.8–10.5)
WBC # FLD AUTO: 9.96 K/UL — SIGNIFICANT CHANGE UP (ref 3.8–10.5)

## 2025-04-09 PROCEDURE — 99284 EMERGENCY DEPT VISIT MOD MDM: CPT
